# Patient Record
Sex: MALE | Race: ASIAN | NOT HISPANIC OR LATINO | URBAN - METROPOLITAN AREA
[De-identification: names, ages, dates, MRNs, and addresses within clinical notes are randomized per-mention and may not be internally consistent; named-entity substitution may affect disease eponyms.]

---

## 2024-03-04 ENCOUNTER — OFFICE (OUTPATIENT)
Dept: URBAN - METROPOLITAN AREA CLINIC 79 | Facility: CLINIC | Age: 69
End: 2024-03-04

## 2024-03-04 ENCOUNTER — OFFICE (OUTPATIENT)
Dept: URBAN - METROPOLITAN AREA CLINIC 79 | Facility: CLINIC | Age: 69
End: 2024-03-04
Payer: COMMERCIAL

## 2024-03-04 VITALS
TEMPERATURE: 97.5 F | SYSTOLIC BLOOD PRESSURE: 136 MMHG | DIASTOLIC BLOOD PRESSURE: 98 MMHG | WEIGHT: 180 LBS | HEIGHT: 64 IN | HEART RATE: 82 BPM

## 2024-03-04 DIAGNOSIS — K76.0 FATTY (CHANGE OF) LIVER, NOT ELSEWHERE CLASSIFIED: ICD-10-CM

## 2024-03-04 DIAGNOSIS — R93.89 ABNORMAL FINDINGS ON DIAGNOSTIC IMAGING OF OTHER SPECIFIED B: ICD-10-CM

## 2024-03-04 DIAGNOSIS — K64.9 UNSPECIFIED HEMORRHOIDS: ICD-10-CM

## 2024-03-04 DIAGNOSIS — Z12.11 ENCOUNTER FOR SCREENING FOR MALIGNANT NEOPLASM OF COLON: ICD-10-CM

## 2024-03-04 DIAGNOSIS — E66.9 OBESITY, UNSPECIFIED: ICD-10-CM

## 2024-03-04 DIAGNOSIS — Z80.0 FAMILY HISTORY OF MALIGNANT NEOPLASM OF DIGESTIVE ORGANS: ICD-10-CM

## 2024-03-04 DIAGNOSIS — R74.8 ABNORMAL LEVELS OF OTHER SERUM ENZYMES: ICD-10-CM

## 2024-03-04 PROCEDURE — 00031: CPT | Performed by: INTERNAL MEDICINE

## 2024-03-04 PROCEDURE — 99205 OFFICE O/P NEW HI 60 MIN: CPT | Performed by: PHYSICIAN ASSISTANT

## 2024-03-04 NOTE — INTERFACERESULTNOTES
see above. Will need to f/u with PCP for elevated cholesterol, triglycerides, hyperglycemia. Concerning liver enzymes these have remained stable to similar levels in 01/2024. Will need low carb/low fat diet and weight loss efforts to help reduce fatty liver. Repeat fibroscan annually to monitor liver disease progression

## 2024-03-05 LAB
NASH FIBROSURE(R) PLUS: ALPHA 2-MACROGLOBULINS, QN: 310 MG/DL — HIGH (ref 110–276)
NASH FIBROSURE(R) PLUS: ALT (SGPT) P5P: 94 IU/L — HIGH (ref 0–55)
NASH FIBROSURE(R) PLUS: APOLIPOPROTEIN A-1: 165 MG/DL (ref 101–178)
NASH FIBROSURE(R) PLUS: AST (SGOT) P5P: 56 IU/L — HIGH (ref 0–40)
NASH FIBROSURE(R) PLUS: BILIRUBIN, TOTAL: 0.4 MG/DL (ref 0–1.2)
NASH FIBROSURE(R) PLUS: CHOLESTEROL, TOTAL: 285 MG/DL — HIGH (ref 100–199)
NASH FIBROSURE(R) PLUS: COMMENT: (no result)
NASH FIBROSURE(R) PLUS: FIBROSIS SCORE: 0.52 — HIGH (ref 0–0.21)
NASH FIBROSURE(R) PLUS: FIBROSIS SCORING: (no result)
NASH FIBROSURE(R) PLUS: FIBROSIS STAGE: (no result)
NASH FIBROSURE(R) PLUS: GGT: 73 IU/L — HIGH (ref 0–65)
NASH FIBROSURE(R) PLUS: GLUCOSE, SERUM: 113 MG/DL — HIGH (ref 70–99)
NASH FIBROSURE(R) PLUS: HAPTOGLOBIN: 121 MG/DL (ref 32–363)
NASH FIBROSURE(R) PLUS: INTERPRETATIONS: (no result)
NASH FIBROSURE(R) PLUS: LIMITATIONS: (no result)
NASH FIBROSURE(R) PLUS: METHODOLOGY: (no result)
NASH FIBROSURE(R) PLUS: NASH GRADE: (no result)
NASH FIBROSURE(R) PLUS: NASH SCORE: 0.84 — HIGH (ref 0–0.25)
NASH FIBROSURE(R) PLUS: NASH SCORING: (no result)
NASH FIBROSURE(R) PLUS: STEATOSIS GRADE: (no result)
NASH FIBROSURE(R) PLUS: STEATOSIS SCORE: 0.69 — HIGH (ref 0–0.4)
NASH FIBROSURE(R) PLUS: STEATOSIS SCORING: (no result)
NASH FIBROSURE(R) PLUS: TRIGLYCERIDES: 215 MG/DL — HIGH (ref 0–149)

## 2024-03-07 ENCOUNTER — OFFICE (OUTPATIENT)
Dept: URBAN - METROPOLITAN AREA CLINIC 102 | Facility: CLINIC | Age: 69
End: 2024-03-07
Payer: COMMERCIAL

## 2024-03-07 DIAGNOSIS — K76.0 FATTY (CHANGE OF) LIVER, NOT ELSEWHERE CLASSIFIED: ICD-10-CM

## 2024-03-07 PROCEDURE — 76981 USE PARENCHYMA: CPT | Performed by: PHYSICIAN ASSISTANT

## 2024-03-21 ENCOUNTER — AMBULATORY SURGICAL CENTER (OUTPATIENT)
Dept: URBAN - METROPOLITAN AREA SURGERY 23 | Facility: SURGERY | Age: 69
End: 2024-03-21
Payer: COMMERCIAL

## 2024-03-21 DIAGNOSIS — K63.5 POLYP OF COLON: ICD-10-CM

## 2024-03-21 DIAGNOSIS — Z80.0 FAMILY HISTORY OF MALIGNANT NEOPLASM OF DIGESTIVE ORGANS: ICD-10-CM

## 2024-03-21 DIAGNOSIS — D12.4 BENIGN NEOPLASM OF DESCENDING COLON: ICD-10-CM

## 2024-03-21 DIAGNOSIS — D12.2 BENIGN NEOPLASM OF ASCENDING COLON: ICD-10-CM

## 2024-03-21 DIAGNOSIS — K57.30 DIVERTICULOSIS OF LARGE INTESTINE WITHOUT PERFORATION OR ABS: ICD-10-CM

## 2024-03-21 DIAGNOSIS — Z12.11 ENCOUNTER FOR SCREENING FOR MALIGNANT NEOPLASM OF COLON: ICD-10-CM

## 2024-03-21 PROCEDURE — 45385 COLONOSCOPY W/LESION REMOVAL: CPT | Mod: PT | Performed by: INTERNAL MEDICINE

## 2024-03-21 PROCEDURE — 45380 COLONOSCOPY AND BIOPSY: CPT | Mod: 59,PT | Performed by: INTERNAL MEDICINE

## 2024-03-21 PROCEDURE — 45380 COLONOSCOPY AND BIOPSY: CPT | Mod: PT,59 | Performed by: INTERNAL MEDICINE
